# Patient Record
Sex: FEMALE | Race: WHITE | Employment: OTHER | URBAN - METROPOLITAN AREA
[De-identification: names, ages, dates, MRNs, and addresses within clinical notes are randomized per-mention and may not be internally consistent; named-entity substitution may affect disease eponyms.]

---

## 2020-06-02 NOTE — PROGRESS NOTES
[facility] [place] [facility] [place] [address] [facility]  [place]: [phone]  F: [phone]      Reason for Visit:   Rosealee Boas is a 82 y.o. female who is seen in consultation at the request of Dr. Tracey Plata for evaluation of lung cancer. 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 Bialteral pulmonary nodules, largest 2cm in EMIR, with spiculated margins. Single incidental solid pulmonary nodule >8mm. Mediastinal lymphadenopathy  · PET/CT 3/3/2020: Spiculated EMIR mass hypermetabolic. Three hypermetabolic mediastinal lymph nodes. · CT guided biopsy of left lung mass 3/12/2020: adenocarcinoma, TTF1 positive  · MRI brain 4/15/2020: negative  · EBUS by Dr. Bárbara Prescott Will 4/20/2020: biopsy of multiple nodes, pathology all negative  · Stage IA3 (cT1c cN0 M0) Non-small cell lung cancer  · SBRT to left lung completed 5/7/2020, given in [place]    History of Present Illness:   Rosealee Boas is a pleasant 82 y.o. female who presents today for evaluation of lung cancer. She presented initially with dyspnea which prompted a CXR. This demonstrated a left lung lesion. This was confirmed on a CT scan, which also noted mediastinal adenopathy. She had a CT guided biopsy of the lung lesion confirming a diagnosis of adenocarcinoma. An EBUS was performed with biopsy of multiple nodes that were all negative. She received SBRT completed last month. Her oncologist there had also recommended sequential chemotherapy with Carboplatin and Pemetrexed followed by immunotherapy with Durvalumab. She has since moved to this area.   She does not want chemotherapy and is here for another opinion. She reports considerable fatigue, though she tries to stay active during the day with housework. Some WIN, especially with the heat. Some cough, nonproductive. She is accompanied by her daughter-in-law today. She is living with her son and daughter-in-law. I see her daughter-in-law for VTE. Past Medical History:   Diagnosis Date    Breast cancer ([facility] [address]     Celiac disease     Fibromyalgia     Heart disease     Hypertension     Lung cancer [facility])       Past Surgical History:   Procedure Laterality Date    HX APPENDECTOMY      HX BREAST LUMPECTOMY Left 2016    HX HYSTERECTOMY      HX SEPTOPLASTY      HX TONSILLECTOMY        Social History     Tobacco Use    Smoking status: Former Smoker     Packs/day: 1.50     Years: 40.00     Pack years: 60.00     Types: Cigarettes     Last attempt to quit: 2000     Years since quittin.4    Smokeless tobacco: Never Used   Substance Use Topics    Alcohol use: Not Currently      Family History   Problem Relation Age of Onset    Cancer Mother     Heart Disease Mother     Hypertension Mother     Stroke Mother     Cancer Brother     Heart Disease Brother     Hypertension Brother      Current Outpatient Medications   Medication Sig    clonazePAM (KlonoPIN) 1 mg tablet Take  by mouth two (2) times a day.  imipramine (TOFRANIL) 25 mg tablet Take 25 mg by mouth nightly.  simvastatin (ZOCOR) 40 mg tablet Take  by mouth nightly.  aspirin delayed-release 81 mg tablet Take  by mouth daily.  methenamine hippurate (HIPREX) 1 gram tablet Take 1 g by mouth two (2) times daily (with meals).  metoprolol tartrate (LOPRESSOR) 25 mg tablet Take  by mouth two (2) times a day.  RABEprazole (Aciphex) 20 mg tablet Take 20 mg by mouth daily.  loratadine (CLARITIN) 10 mg tablet Take 10 mg by mouth.  levothyroxine (SYNTHROID) 88 mcg tablet Take  by mouth Daily (before breakfast).     docusate sodium (COLACE PO) Take  by mouth.  trimethoprim (TRIMPEX) 100 mg tablet Take  by mouth two (2) times a day.  calcium carb/magnesium hydrox (MYLANTA PO) Take  by mouth.  fluticasone propionate (FLONASE NA) by Nasal route. No current facility-administered medications for this visit. Allergies   Allergen Reactions    Iodine Anaphylaxis    Bactrim [Sulfamethoprim] Vertigo    Benadr [Diphenhydramine Hcl] Shortness of Breath    Codeine Other (comments)     \" fainted\"    Doxycycline Other (comments)     \" yeast infection\"    Isoniazid Other (comments)     \" very ill- hospitalized\"    Pneumococcal Vaccine Shortness of Breath    Prednisone Other (comments)     \"Flu like symptoms\"    Tetanus Vaccines And Toxoid Vertigo        Review of Systems: A complete review of systems was obtained, negative except as described above. Physical Exam:     Visit Vitals  /85 (BP 1 Location: Right arm, BP Patient Position: Sitting)   Pulse 85   Temp 97.8 °F (36.6 °C) (Temporal)   Resp 20   Wt 175 lb (79.4 kg)   SpO2 95%     ECOG PS: 1  General: No distress  Eyes: PERRLA, anicteric sclerae  HENT: Atraumatic, OP clear  Neck: Supple  Lymphatic: No cervical, supraclavicular, or inguinal adenopathy  Respiratory: CTAB, normal respiratory effort  CV: Normal rate, regular rhythm, no murmurs, no peripheral edema  GI: Soft, nontender, nondistended, no masses, no hepatomegaly, no splenomegaly  MS: Normal gait and station. Digits without clubbing or cyanosis. Skin: No rashes, ecchymoses, or petechiae. Normal temperature, turgor, and texture. Psych: Alert, oriented, appropriate affect, normal judgment/insight    Results:     Records reviewed and summarized above. Pathology report(s) reviewed above. Radiology report(s) reviewed above. Assessment:   1) Non-small cell lung cancer  Stage IA3  Her PET was concerning for Stage III disease based on mediastinal involvement.   However, EBUS with multiple biopsies of mediastinal nodes was negative. She received what sounds like definitive SBRT based on her report of 5 fractions. I will request radiation oncology records to confirm. Her prior oncologist apparently was recommending sequential chemotherapy as well, though she was reluctant to proceed with this. As this has been managed thus far as Stage I disease (ie SBRT to the lung primary but presumably no radiation to the mediastinum), I don't see a role for sequential chemotherapy at this time. My recommendation is for close surveillance. I will repeat a CT in one month. Request prior images for comparison. If her disease appears to be progressing, I will repeat her PET and review at thoracic tumor board. If she appears to have responded, I will move to surveillance with CT every 3 months. Surveillance for Non-Small Cell Lung Cancer treated with radiation:  · H&P every 3-6 months for 3 years, then every 6 months for 2 years, then yearly. · Chest CT +/- contrast every 3-6 months for 3 years, then every 6 months for 2 years, then low-dose Chest CT yearly    2) HTN  Elevated today. I encouraged her to follow up with her PCP about this. 3) Iodine contrast allergy  She reports having significant problems even with premedication. We will obtain her CT without contrast.    Plan:     · Request records from radiation oncology  · Request outside images to be loaded into our system  · CT Chest wo contrast in 1 month  · Return to see me in 1 month    I appreciate the opportunity to participate in Ms. Selena Villalobos's care.     Signed By: Nader Walker MD

## 2020-06-09 ENCOUNTER — OFFICE VISIT (OUTPATIENT)
Dept: ONCOLOGY | Age: 85
End: 2020-06-09

## 2020-06-09 VITALS
OXYGEN SATURATION: 95 % | HEART RATE: 85 BPM | SYSTOLIC BLOOD PRESSURE: 171 MMHG | DIASTOLIC BLOOD PRESSURE: 85 MMHG | TEMPERATURE: 97.8 F | WEIGHT: 175 LBS | RESPIRATION RATE: 20 BRPM

## 2020-06-09 DIAGNOSIS — C34.92 NON-SMALL CELL CANCER OF LEFT LUNG (HCC): Primary | ICD-10-CM

## 2020-06-09 RX ORDER — ASPIRIN 81 MG/1
TABLET ORAL DAILY
COMMUNITY

## 2020-06-09 RX ORDER — RABEPRAZOLE SODIUM 20 MG/1
20 TABLET, DELAYED RELEASE ORAL DAILY
COMMUNITY

## 2020-06-09 RX ORDER — SIMVASTATIN 40 MG/1
TABLET, FILM COATED ORAL
COMMUNITY

## 2020-06-09 RX ORDER — LORATADINE 10 MG/1
10 TABLET ORAL
COMMUNITY

## 2020-06-09 RX ORDER — LEVOTHYROXINE SODIUM 88 UG/1
TABLET ORAL
COMMUNITY

## 2020-06-09 RX ORDER — CLONAZEPAM 1 MG/1
TABLET ORAL 2 TIMES DAILY
COMMUNITY

## 2020-06-09 RX ORDER — METHENAMINE HIPPURATE 1000 MG/1
1 TABLET ORAL 2 TIMES DAILY WITH MEALS
COMMUNITY

## 2020-06-09 RX ORDER — IMIPRAMINE HYDROCHLORIDE 25 MG/1
25 TABLET ORAL
COMMUNITY

## 2020-06-09 RX ORDER — TRIMETHOPRIM 100 MG/1
TABLET ORAL 2 TIMES DAILY
COMMUNITY

## 2020-06-09 RX ORDER — METOPROLOL TARTRATE 25 MG/1
TABLET, FILM COATED ORAL 2 TIMES DAILY
COMMUNITY

## 2020-06-15 ENCOUNTER — DOCUMENTATION ONLY (OUTPATIENT)
Dept: ONCOLOGY | Age: 85
End: 2020-06-15

## 2020-06-15 NOTE — PROGRESS NOTES
[facility] at [facility]  [phone]    Records reviewed. She had SBRT from 5/1/2020 to 5/7/2020. After the negative biopsy, her team decided against their original plan of chemotherapy and radiation. They were planning surveillance imaging. This had been my recommendation as well at her recent visit, so we will proceed with plans for surveillance imaging.

## 2020-06-16 ENCOUNTER — TELEPHONE (OUTPATIENT)
Dept: ONCOLOGY | Age: 85
End: 2020-06-16

## 2020-06-16 NOTE — TELEPHONE ENCOUNTER
[address] [place] at [facility]  [phone]    06/16/20- Imaging requested and received from [facility] on a CD- images submitted to [facility] radiology for comparison.     2/10/20- CT chest w/o contrast  2/11/20- Chest  3/3/20- NM PET/CT  3/12/20- CT biopsy  3/13/20- CT ab/pel w/o  4/15/20- MRI brain w/wo

## 2020-06-18 ENCOUNTER — TELEPHONE (OUTPATIENT)
Dept: ONCOLOGY | Age: 85
End: 2020-06-18

## 2020-06-18 ENCOUNTER — HOSPITAL ENCOUNTER (OUTPATIENT)
Dept: CT IMAGING | Age: 85
Discharge: HOME | End: 2020-06-18
Attending: INTERNAL MEDICINE
Payer: MEDICARE

## 2020-06-18 DIAGNOSIS — C34.92 NON-SMALL CELL CANCER OF LEFT LUNG (HCC): ICD-10-CM

## 2020-06-18 PROCEDURE — 71250 CT THORAX DX C-: CPT

## 2020-06-18 NOTE — TELEPHONE ENCOUNTER
Called patient to set up a 1 month follow up with Dr. Alber Fierro patient did not answer and the mail box has not been set up yet

## 2020-06-19 ENCOUNTER — TELEPHONE (OUTPATIENT)
Dept: ONCOLOGY | Age: 85
End: 2020-06-19

## 2020-06-22 ENCOUNTER — TELEPHONE (OUTPATIENT)
Dept: ONCOLOGY | Age: 85
End: 2020-06-22

## 2020-06-22 NOTE — TELEPHONE ENCOUNTER
[address] at [facility]  [phone]    06/22/20- VM left for patient- needing to advise her per  that her CT scan results  looks good, MD will discuss further at her next visit. 12:04 PM- Phone call returned to patient advised her of CT results per . She reports new onset of rectal mostly bright red bleeding and mucus about 3 times weekly and one new episode of hemoptysis. She has mild dizziness \" I felt like I was going to fall last week\". She denies any falls, fevers,SOB, V/D. Has baseline mild nausea but feels she is eating and drinking okay. Her next appointment with  not until 7/8/20- advised her returned phone call will be made with NP recommendation. 1:37 PM- Returned phone call to patient advised her per Chapin Yeager. Patient needs urgent evaluation with PCP/GI. May be easier to get in with PCP first, unless she has a GI doctor already. HOLD aspirin with bleeding and avoid NSAIDS. Patient does not have a GI MD so she will follow up with PCP. She denied any further questions or concerns.

## 2020-07-08 ENCOUNTER — OFFICE VISIT (OUTPATIENT)
Dept: ONCOLOGY | Age: 85
End: 2020-07-08

## 2020-07-08 VITALS
TEMPERATURE: 96 F | SYSTOLIC BLOOD PRESSURE: 137 MMHG | RESPIRATION RATE: 18 BRPM | WEIGHT: 175 LBS | HEART RATE: 87 BPM | DIASTOLIC BLOOD PRESSURE: 71 MMHG | OXYGEN SATURATION: 96 %

## 2020-07-08 DIAGNOSIS — C34.92 NON-SMALL CELL CANCER OF LEFT LUNG (HCC): Primary | ICD-10-CM

## 2020-07-08 NOTE — PROGRESS NOTES
Alisha Naylor is a 82 y.o. female follow up for lung cancer. 1. Have you been to the ER, urgent care clinic since your last visit? Hospitalized since your last visit?no     2. Have you seen or consulted any other health care providers outside of the [address] [place] [address] since your last visit? Include any pap smears or colon screening.  no

## 2020-07-08 NOTE — PROGRESS NOTES
[facility] [place] [facility] [place] [address] [facility]  [place]: [phone]  F: [phone]      Reason for Visit:   Digna Mathews is a 82 y.o. female who is seen in follow up for evaluation of lung cancer. 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 Bialteral pulmonary nodules, largest 2cm in EMIR, with spiculated margins. Single incidental solid pulmonary nodule >8mm. Mediastinal lymphadenopathy  · PET/CT 3/3/2020: Spiculated EMIR mass hypermetabolic. Three hypermetabolic mediastinal lymph nodes. · CT guided biopsy of left lung mass 3/12/2020: adenocarcinoma, TTF1 positive  · MRI brain 4/15/2020: negative  · EBUS by Dr. Ngo Cones Will 4/20/2020: biopsy of multiple nodes, pathology all negative  · Stage IA3 (cT1c cN0 M0) Non-small cell lung cancer  · SBRT to left lung completed 5/7/2020, given in [place]    History of Present Illness:   Low appetite. She is eating cereal in the morning, usually cheese for lunch and beans for dinner. Moderate fatigue, staying active in the home. Mild nausea, no vomiting. Joint aches and pains chronically due to fibromyalgia. Having rectal bleeding and mucus at times, this was an issue when she first moved here, now mild and infrequent. She is accompanied by her son. She is living with her son and daughter-in-law. I see her daughter-in-law for VTE. PAST HISTORY: The following sections were reviewed and updated in the EMR as appropriate: PMH, SH, FH, Medications, Allergies.     Allergies   Allergen Reactions    Iodine Anaphylaxis    Bactrim [Sulfamethoprim] Vertigo    Benadr [Diphenhydramine Hcl] Shortness of Breath    Codeine Other (comments)     \" fainted\"    Doxycycline Other (comments)     \" yeast infection\"    Isoniazid Other (comments)     \" very ill- hospitalized\"    Pneumococcal Vaccine Shortness of Breath    Prednisone Other (comments)     \"Flu like symptoms\"    Tetanus Vaccines And Toxoid Vertigo      Review of Systems: A complete review of systems was obtained, reviewed, and scanned into the EMR. Pertinent findings reviewed above. Physical Exam:     Visit Vitals  /71 (BP 1 Location: Right arm, BP Patient Position: Sitting)   Pulse 87   Temp (!) 96 °F (35.6 °C) (Temporal)   Resp 18   Wt 175 lb (79.4 kg)   SpO2 96%     ECOG PS: 1  General: No distress  Eyes: PERRLA, anicteric sclerae  HENT: Atraumatic, OP clear  Neck: Supple  Respiratory: CTAB, normal respiratory effort  CV: Normal rate, regular rhythm, no murmurs, no peripheral edema  GI: Soft, nontender, nondistended, no masses, no hepatomegaly, no splenomegaly  MS: Normal gait and station. Digits without clubbing or cyanosis. Skin: No rashes, ecchymoses, or petechiae. Normal temperature, turgor, and texture. Psych: Alert, oriented, appropriate affect, normal judgment/insight    Results:   No results found for: WBC, WBCLT, HGBPOC, HGB, HGBP, HCTPOC, HCT, PHCT, RBCH, PLT, MCV, HGBEXT, HCTEXT, PLTEXT, HGBEXT, HCTEXT, PLTEXT   No results found for: NA, K, CL, CO2, AGAP, GLU, BUN, CREA, BUCR, GFRAA, GFRNA, CA, TBIL, TBILI, AP, TP, ALB, GLOB, AGRAT, ALT, AST    Records reviewed and summarized above. Pathology report(s) reviewed above. Radiology report(s) reviewed above. Assessment:   1) Non-small cell lung cancer  Stage IA3  Her PET was concerning for Stage III disease based on mediastinal involvement. However, EBUS with multiple biopsies of mediastinal nodes was negative. She received definitive SBRT 5/2020. My recommendation is for close surveillance. She completed CT as above with no evidence of progressive disease.  We will move to surveillance with CT every 3 months. Surveillance for Non-Small Cell Lung Cancer treated with radiation:  · H&P every 3-6 months for 3 years, then every 6 months for 2 years, then yearly. · Chest CT +/- contrast every 3-6 months for 3 years, then every 6 months for 2 years, then low-dose Chest CT yearly    2) HTN  Improved today. Seen by PCP    3) Iodine contrast allergy  She reports having significant problems even with premedication. We will obtain her CT without contrast.    4) Blood per rectum  Recommend to see GI for repeat colonoscopy. History of polyps per patient report, she was due for colonoscopy in [place]. She is unsure if she would want to proceed with another colonoscopy at this time. She would like to focus on her diet, avoiding nuts and spices. She relates to her IBS. No recent CBC on file. States she had labs with PCP, will obtain these records. Plan:     · CT Chest wo contrast in 3 months  · Return to see me in 3 months    Patient was seen in conjunction with Katarina Padilla NP.     Signed By: Brigid Dailey MD

## 2020-07-09 ENCOUNTER — TELEPHONE (OUTPATIENT)
Dept: ONCOLOGY | Age: 85
End: 2020-07-09

## 2020-07-09 NOTE — TELEPHONE ENCOUNTER
Called patient to make follow up appointment. No answer, left a vm.      0 3 months (around 10/8/2020) for lung cancer fu.

## 2020-08-28 ENCOUNTER — TELEPHONE (OUTPATIENT)
Dept: ONCOLOGY | Age: 85
End: 2020-08-28

## 2020-08-28 NOTE — TELEPHONE ENCOUNTER
[facility]  [phone]    08/28/20- Patient reports WIN in the last week with going up stairs, taking about 5 minutes to recover. She also reports a 5 lb weight loss in about 3 weeks despite eating the same. She has a productive cough, no fevers. Stated she also has chronic back pain and is seeing Dr. Bhavana Cartagena with [facility]. Had an MRI today that showed \"several pinched nerves\". Stated he wants to do a procedure on her 9/29, but will need to clear it with us first.  Discussed the above with Dr. Kimi Thomas, patient is due for her surveillance CT in the next couple weeks. Encouraged her to schedule that soon and a follow up to review results. Patient verbalized understanding, no further questions or concerns.

## 2020-08-28 NOTE — TELEPHONE ENCOUNTER
Patient called and stated that she got a letter from her doctor in [place] that stated she should notify Dr. Kassy Sánchez if she lost 5 pound or more in a certain time period. She stated that she has and she is now worried because she has trouble breathing once she walks up her stairs and she has never had this problem before. She would like a call back when possible.     Call back [phone]

## 2020-09-11 ENCOUNTER — HOSPITAL ENCOUNTER (OUTPATIENT)
Dept: CT IMAGING | Age: 85
Discharge: HOME | End: 2020-09-11
Attending: NURSE PRACTITIONER
Payer: MEDICARE

## 2020-09-11 DIAGNOSIS — C34.92 NON-SMALL CELL CANCER OF LEFT LUNG (HCC): ICD-10-CM

## 2020-09-11 PROCEDURE — 71250 CT THORAX DX C-: CPT

## 2020-09-15 ENCOUNTER — OFFICE VISIT (OUTPATIENT)
Dept: ONCOLOGY | Age: 85
End: 2020-09-15
Payer: MEDICARE

## 2020-09-15 VITALS
WEIGHT: 175 LBS | OXYGEN SATURATION: 98 % | RESPIRATION RATE: 22 BRPM | HEART RATE: 91 BPM | SYSTOLIC BLOOD PRESSURE: 145 MMHG | TEMPERATURE: 97 F | DIASTOLIC BLOOD PRESSURE: 78 MMHG

## 2020-09-15 DIAGNOSIS — C34.92 NON-SMALL CELL CANCER OF LEFT LUNG (HCC): Primary | ICD-10-CM

## 2020-09-15 PROCEDURE — G8432 DEP SCR NOT DOC, RNG: HCPCS | Performed by: INTERNAL MEDICINE

## 2020-09-15 PROCEDURE — 99214 OFFICE O/P EST MOD 30 MIN: CPT | Performed by: INTERNAL MEDICINE

## 2020-09-15 PROCEDURE — 1101F PT FALLS ASSESS-DOCD LE1/YR: CPT | Performed by: INTERNAL MEDICINE

## 2020-09-15 PROCEDURE — G8421 BMI NOT CALCULATED: HCPCS | Performed by: INTERNAL MEDICINE

## 2020-09-15 PROCEDURE — 1090F PRES/ABSN URINE INCON ASSESS: CPT | Performed by: INTERNAL MEDICINE

## 2020-09-15 PROCEDURE — G8400 PT W/DXA NO RESULTS DOC: HCPCS | Performed by: INTERNAL MEDICINE

## 2020-09-15 PROCEDURE — G8427 DOCREV CUR MEDS BY ELIG CLIN: HCPCS | Performed by: INTERNAL MEDICINE

## 2020-09-15 PROCEDURE — G0463 HOSPITAL OUTPT CLINIC VISIT: HCPCS | Performed by: INTERNAL MEDICINE

## 2020-09-15 PROCEDURE — G8536 NO DOC ELDER MAL SCRN: HCPCS | Performed by: INTERNAL MEDICINE

## 2020-09-15 NOTE — PROGRESS NOTES
Laymon Leyden is a 82 y.o. female follow up for lung cancer. 1. Have you been to the ER, urgent care clinic since your last visit? Hospitalized since your last visit?no     2. Have you seen or consulted any other health care providers outside of the [address] [place] [address] since your last visit? Include any pap smears or colon screening.  no

## 2020-12-02 ENCOUNTER — TELEPHONE (OUTPATIENT)
Dept: ONCOLOGY | Age: 85
End: 2020-12-02

## 2020-12-02 NOTE — TELEPHONE ENCOUNTER
[employer] at [facility]  [phone]    12/02/20- Per Leticia Melendez for CT- she attempted to schedule CT prior to upcoming appointment. Per Ramiro Smith she talked with the daughter in law Emani Hurd on the cell phone number, the patient has moved back to [place] and won't be seeing the doctor anymore.

## 2022-03-19 PROBLEM — C34.92 NON-SMALL CELL CANCER OF LEFT LUNG (HCC): Status: ACTIVE | Noted: 2020-07-08

## 2023-05-20 RX ORDER — IMIPRAMINE HCL 25 MG
25 TABLET ORAL NIGHTLY
COMMUNITY

## 2023-05-20 RX ORDER — METHENAMINE HIPPURATE 1000 MG/1
1 TABLET ORAL 2 TIMES DAILY WITH MEALS
COMMUNITY

## 2023-05-20 RX ORDER — ASPIRIN 81 MG/1
TABLET ORAL DAILY
COMMUNITY

## 2023-05-20 RX ORDER — RABEPRAZOLE SODIUM 20 MG/1
20 TABLET, DELAYED RELEASE ORAL DAILY
COMMUNITY

## 2023-05-20 RX ORDER — SIMVASTATIN 40 MG
TABLET ORAL
COMMUNITY

## 2023-05-20 RX ORDER — CLONAZEPAM 1 MG/1
TABLET ORAL 2 TIMES DAILY
COMMUNITY

## 2023-05-20 RX ORDER — TRIMETHOPRIM 100 MG/1
TABLET ORAL 2 TIMES DAILY
COMMUNITY

## 2023-05-20 RX ORDER — LORATADINE 10 MG/1
10 TABLET ORAL
COMMUNITY

## 2023-05-20 RX ORDER — LEVOTHYROXINE SODIUM 88 UG/1
TABLET ORAL
COMMUNITY